# Patient Record
Sex: FEMALE | Race: BLACK OR AFRICAN AMERICAN
[De-identification: names, ages, dates, MRNs, and addresses within clinical notes are randomized per-mention and may not be internally consistent; named-entity substitution may affect disease eponyms.]

---

## 2020-06-21 ENCOUNTER — HOSPITAL ENCOUNTER (EMERGENCY)
Dept: HOSPITAL 26 - MED | Age: 60
LOS: 1 days | Discharge: HOME | End: 2020-06-22
Payer: COMMERCIAL

## 2020-06-21 VITALS — HEIGHT: 69 IN | WEIGHT: 198 LBS | BODY MASS INDEX: 29.33 KG/M2

## 2020-06-21 VITALS — DIASTOLIC BLOOD PRESSURE: 96 MMHG | SYSTOLIC BLOOD PRESSURE: 150 MMHG

## 2020-06-21 DIAGNOSIS — R51: Primary | ICD-10-CM

## 2020-06-21 DIAGNOSIS — I10: ICD-10-CM

## 2020-06-21 DIAGNOSIS — Z79.899: ICD-10-CM

## 2020-06-21 PROCEDURE — 96372 THER/PROPH/DIAG INJ SC/IM: CPT

## 2020-06-21 PROCEDURE — 99284 EMERGENCY DEPT VISIT MOD MDM: CPT

## 2020-06-21 PROCEDURE — 70450 CT HEAD/BRAIN W/O DYE: CPT

## 2020-06-21 NOTE — NUR
PT REFUSED IV PLACEMENT. DR. QUINTANA MADE AWARE AND GAVE OK TO GIVE TORADOL 30MG TO 
BE GIVEN IM. GIVEN IM IN L DELTOID.

## 2020-06-21 NOTE — NUR
59F PRESENTS TO ED WITH C/O THROBBING HEADACHE THAT STARTED 0300. PT STATES 
THAT AT 0300 SHE JUST WOKE AND FELT LIKE HER HEAD WAS HIT WITH A BRICK. STATES 
THAT THEY HAVE TAKEN EXEDRIN X 2 AT 1400 TODAY WITH MINIMAL PAIN RELIEF. PT 
STATES PAIN IS RADIATING TO RT NECK. DENIES BLURRY VISION. DENIES LOC. DENIES 
SOB/COUGH. 

RR EVEN AND UNLABORED. DENIES N/V/D. HEART SOUNDS EVEN AND REGULAR. 



PMHX: DENIES

RX: DENIES



NKA 

NEGATIVE FOR COVID SCREENING.

## 2020-06-22 VITALS — DIASTOLIC BLOOD PRESSURE: 98 MMHG | SYSTOLIC BLOOD PRESSURE: 148 MMHG

## 2020-06-22 NOTE — NUR
Patient discharged with v/s stable. Written and verbal after care instructions 
given and explained. 

Patient alert, oriented and verbalized understanding of instructions. 
Ambulatory with steady gait. All questions addressed prior to discharge. ID 
band removed. Patient advised to follow up with PMD. Rx of NAXPORXEN given. 
Patient educated on indication of medication including possible reaction and 
side effects. Opportunity to ask questions provided and answered.

## 2020-06-22 NOTE — NUR
PT REFUSED MORPHINE FOR PAIN. PT STATES THAT PAIN WAS RELIVED BY TORADOL AND 
REDUCED FROM 10/10 TO 5/10. NADR FROM TORADOL.

## 2020-09-10 ENCOUNTER — HOSPITAL ENCOUNTER (EMERGENCY)
Dept: HOSPITAL 26 - MED | Age: 60
Discharge: HOME | End: 2020-09-10
Payer: COMMERCIAL

## 2020-09-10 VITALS — DIASTOLIC BLOOD PRESSURE: 78 MMHG | SYSTOLIC BLOOD PRESSURE: 114 MMHG

## 2020-09-10 VITALS — HEIGHT: 69 IN | BODY MASS INDEX: 30.66 KG/M2 | WEIGHT: 207 LBS

## 2020-09-10 VITALS — SYSTOLIC BLOOD PRESSURE: 115 MMHG | DIASTOLIC BLOOD PRESSURE: 80 MMHG

## 2020-09-10 DIAGNOSIS — S90.852A: Primary | ICD-10-CM
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
## 2020-09-10 DIAGNOSIS — Z79.899: ICD-10-CM
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
## 2020-09-10 PROCEDURE — 90471 IMMUNIZATION ADMIN: CPT

## 2020-09-10 PROCEDURE — 73620 X-RAY EXAM OF FOOT: CPT

## 2020-09-10 PROCEDURE — 73630 X-RAY EXAM OF FOOT: CPT

## 2020-09-10 PROCEDURE — 99285 EMERGENCY DEPT VISIT HI MDM: CPT

## 2020-09-10 PROCEDURE — 90715 TDAP VACCINE 7 YRS/> IM: CPT

## 2020-09-10 PROCEDURE — 10120 INC&RMVL FB SUBQ TISS SMPL: CPT

## 2020-09-10 NOTE — NUR
Patient discharged with v/s stable. Written and verbal after care instructions 
given and explained. 

Patient alert, oriented and verbalized understanding of instructions. 
Ambulatory with steady gait. All questions addressed prior to discharge. ID 
band removed. Patient advised to follow up with PMD. Rx of MOTRIN AND KEFLEX 
given. Patient educated on indication of medication including possible reaction 
and side effects. Opportunity to ask questions provided and answered.

## 2020-09-10 NOTE — NUR
PT STEPPED IN GLASS 2 DAYS AGO WITH HER LEFT FOOT, THOUGHT SHE REMOVED ALL THE 
GLASS BUT FEELS LIKE THERE ARE STILL SHARDS OF GLASS IN THE BALL OF HER FOOT.  
THERE IS A SMALL CUT WHERE THE LARGER PIECE OF GLASS WENT IN HER FOOT, NO 
BLEEDING REDNESS OR SWELLING TO AREA.  PT STATES SHES UNABLE TO PUT ANY DIRECT 
PRESSURE ON THAT AREA DUE TO THE SHARP PAIN IN HER FOOT.



NKA

NO HX

## 2020-09-18 ENCOUNTER — HOSPITAL ENCOUNTER (EMERGENCY)
Dept: HOSPITAL 26 - MED | Age: 60
Discharge: HOME | End: 2020-09-18
Payer: COMMERCIAL

## 2020-09-18 VITALS — BODY MASS INDEX: 30.81 KG/M2 | HEIGHT: 69 IN | WEIGHT: 208 LBS

## 2020-09-18 VITALS — DIASTOLIC BLOOD PRESSURE: 74 MMHG | SYSTOLIC BLOOD PRESSURE: 119 MMHG

## 2020-09-18 DIAGNOSIS — S91.312D: Primary | ICD-10-CM

## 2020-09-18 DIAGNOSIS — Z48.02: ICD-10-CM
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
## 2020-09-18 DIAGNOSIS — Z48.00: ICD-10-CM

## 2020-09-18 NOTE — NUR
Patient discharged with v/s stable. Written and verbal after care instructions 
given and explained. 

Patient alert, oriented and verbalized understanding of instructions. 
Ambulatory with steady gait. All questions addressed prior to discharge. ID 
band removed. Patient advised to follow up with PMD. Rx of keflex and bactrim 
given. Patient educated on indication of medication including possible reaction 
and side effects. Opportunity to ask questions provided and answered.

No nursing care provided in our er.

## 2020-09-25 ENCOUNTER — HOSPITAL ENCOUNTER (EMERGENCY)
Dept: HOSPITAL 26 - MED | Age: 60
Discharge: HOME | End: 2020-09-25
Payer: COMMERCIAL

## 2020-09-25 VITALS — WEIGHT: 198 LBS | BODY MASS INDEX: 29.33 KG/M2 | HEIGHT: 69 IN

## 2020-09-25 VITALS — DIASTOLIC BLOOD PRESSURE: 69 MMHG | SYSTOLIC BLOOD PRESSURE: 123 MMHG

## 2020-09-25 DIAGNOSIS — Z79.899: ICD-10-CM
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
## 2020-09-25 DIAGNOSIS — S91.312D: Primary | ICD-10-CM
